# Patient Record
Sex: MALE | Race: WHITE | NOT HISPANIC OR LATINO | Employment: STUDENT | URBAN - METROPOLITAN AREA
[De-identification: names, ages, dates, MRNs, and addresses within clinical notes are randomized per-mention and may not be internally consistent; named-entity substitution may affect disease eponyms.]

---

## 2024-03-22 ENCOUNTER — HOSPITAL ENCOUNTER (INPATIENT)
Facility: HOSPITAL | Age: 24
LOS: 1 days | Discharge: HOME | DRG: 342 | End: 2024-03-23
Attending: EMERGENCY MEDICINE | Admitting: SURGERY
Payer: COMMERCIAL

## 2024-03-22 ENCOUNTER — APPOINTMENT (EMERGENCY)
Dept: CT IMAGING | Facility: HOSPITAL | Age: 24
DRG: 342 | End: 2024-03-22
Payer: COMMERCIAL

## 2024-03-22 DIAGNOSIS — K35.80 ACUTE APPENDICITIS, UNCOMPLICATED: Primary | ICD-10-CM

## 2024-03-22 DIAGNOSIS — Z90.49 S/P LAPAROSCOPIC APPENDECTOMY: ICD-10-CM

## 2024-03-22 LAB
ALBUMIN SERPL BCP-MCNC: 4.7 G/DL (ref 3.5–5)
ALP SERPL-CCNC: 55 U/L (ref 34–104)
ALT SERPL W P-5'-P-CCNC: 19 U/L (ref 7–52)
ANION GAP SERPL CALCULATED.3IONS-SCNC: 6 MMOL/L (ref 4–13)
AST SERPL W P-5'-P-CCNC: 27 U/L (ref 13–39)
BILIRUB SERPL-MCNC: 0.24 MG/DL (ref 0.2–1)
BILIRUB UR QL STRIP: NEGATIVE
BUN SERPL-MCNC: 28 MG/DL (ref 5–25)
CALCIUM SERPL-MCNC: 9.6 MG/DL (ref 8.4–10.2)
CHLORIDE SERPL-SCNC: 102 MMOL/L (ref 96–108)
CLARITY UR: CLEAR
CO2 SERPL-SCNC: 28 MMOL/L (ref 21–32)
COLOR UR: COLORLESS
CREAT SERPL-MCNC: 1.35 MG/DL (ref 0.6–1.3)
ERYTHROCYTE [DISTWIDTH] IN BLOOD BY AUTOMATED COUNT: 13.3 % (ref 11.6–15.1)
GFR SERPL CREATININE-BSD FRML MDRD: 74 ML/MIN/1.73SQ M
GLUCOSE SERPL-MCNC: 79 MG/DL (ref 65–140)
GLUCOSE UR STRIP-MCNC: NEGATIVE MG/DL
HCT VFR BLD AUTO: 40.4 % (ref 36.5–49.3)
HGB BLD-MCNC: 13 G/DL (ref 12–17)
HGB UR QL STRIP.AUTO: NEGATIVE
KETONES UR STRIP-MCNC: NEGATIVE MG/DL
LEUKOCYTE ESTERASE UR QL STRIP: NEGATIVE
MCH RBC QN AUTO: 28.9 PG (ref 26.8–34.3)
MCHC RBC AUTO-ENTMCNC: 32.2 G/DL (ref 31.4–37.4)
MCV RBC AUTO: 90 FL (ref 82–98)
NITRITE UR QL STRIP: NEGATIVE
PH UR STRIP.AUTO: 6 [PH]
PLATELET # BLD AUTO: 287 THOUSANDS/UL (ref 149–390)
PMV BLD AUTO: 9.9 FL (ref 8.9–12.7)
POTASSIUM SERPL-SCNC: 4.2 MMOL/L (ref 3.5–5.3)
PROT SERPL-MCNC: 7.5 G/DL (ref 6.4–8.4)
PROT UR STRIP-MCNC: NEGATIVE MG/DL
RBC # BLD AUTO: 4.5 MILLION/UL (ref 3.88–5.62)
SODIUM SERPL-SCNC: 136 MMOL/L (ref 135–147)
SP GR UR STRIP.AUTO: 1.03 (ref 1–1.03)
UROBILINOGEN UR STRIP-ACNC: <2 MG/DL
WBC # BLD AUTO: 17.5 THOUSAND/UL (ref 4.31–10.16)

## 2024-03-22 PROCEDURE — 87563 M. GENITALIUM AMP PROBE: CPT

## 2024-03-22 PROCEDURE — 99285 EMERGENCY DEPT VISIT HI MDM: CPT | Performed by: EMERGENCY MEDICINE

## 2024-03-22 PROCEDURE — 74177 CT ABD & PELVIS W/CONTRAST: CPT

## 2024-03-22 PROCEDURE — 87661 TRICHOMONAS VAGINALIS AMPLIF: CPT

## 2024-03-22 PROCEDURE — 96360 HYDRATION IV INFUSION INIT: CPT

## 2024-03-22 PROCEDURE — 81003 URINALYSIS AUTO W/O SCOPE: CPT

## 2024-03-22 PROCEDURE — 36415 COLL VENOUS BLD VENIPUNCTURE: CPT

## 2024-03-22 PROCEDURE — 99284 EMERGENCY DEPT VISIT MOD MDM: CPT

## 2024-03-22 PROCEDURE — 80053 COMPREHEN METABOLIC PANEL: CPT

## 2024-03-22 PROCEDURE — 85027 COMPLETE CBC AUTOMATED: CPT

## 2024-03-22 RX ADMIN — IOHEXOL 100 ML: 350 INJECTION, SOLUTION INTRAVENOUS at 22:28

## 2024-03-22 RX ADMIN — SODIUM CHLORIDE 1000 ML: 0.9 INJECTION, SOLUTION INTRAVENOUS at 22:37

## 2024-03-22 NOTE — Clinical Note
Case was discussed with Brittni Jara MD and the patient's admission status was agreed to be Admission Status: inpatient status to the service of   .

## 2024-03-23 ENCOUNTER — ANESTHESIA (INPATIENT)
Dept: PERIOP | Facility: HOSPITAL | Age: 24
DRG: 342 | End: 2024-03-23
Payer: COMMERCIAL

## 2024-03-23 ENCOUNTER — ANESTHESIA EVENT (INPATIENT)
Dept: PERIOP | Facility: HOSPITAL | Age: 24
DRG: 342 | End: 2024-03-23
Payer: COMMERCIAL

## 2024-03-23 VITALS
BODY MASS INDEX: 24.49 KG/M2 | WEIGHT: 197 LBS | RESPIRATION RATE: 16 BRPM | OXYGEN SATURATION: 98 % | DIASTOLIC BLOOD PRESSURE: 56 MMHG | TEMPERATURE: 98.3 F | HEART RATE: 54 BPM | SYSTOLIC BLOOD PRESSURE: 96 MMHG | HEIGHT: 75 IN

## 2024-03-23 PROBLEM — K35.80 ACUTE APPENDICITIS, UNCOMPLICATED: Status: ACTIVE | Noted: 2024-03-23

## 2024-03-23 PROBLEM — K35.80 ACUTE APPENDICITIS, UNCOMPLICATED: Status: RESOLVED | Noted: 2024-03-23 | Resolved: 2024-03-23

## 2024-03-23 PROBLEM — Z90.49 S/P LAPAROSCOPIC APPENDECTOMY: Status: ACTIVE | Noted: 2024-03-23

## 2024-03-23 LAB
ANION GAP SERPL CALCULATED.3IONS-SCNC: 7 MMOL/L (ref 4–13)
BASOPHILS # BLD AUTO: 0.07 THOUSANDS/ÂΜL (ref 0–0.1)
BASOPHILS NFR BLD AUTO: 1 % (ref 0–1)
BUN SERPL-MCNC: 20 MG/DL (ref 5–25)
CALCIUM SERPL-MCNC: 9 MG/DL (ref 8.4–10.2)
CHLORIDE SERPL-SCNC: 105 MMOL/L (ref 96–108)
CO2 SERPL-SCNC: 26 MMOL/L (ref 21–32)
CREAT SERPL-MCNC: 1.23 MG/DL (ref 0.6–1.3)
EOSINOPHIL # BLD AUTO: 0.21 THOUSAND/ÂΜL (ref 0–0.61)
EOSINOPHIL NFR BLD AUTO: 2 % (ref 0–6)
ERYTHROCYTE [DISTWIDTH] IN BLOOD BY AUTOMATED COUNT: 13.4 % (ref 11.6–15.1)
GFR SERPL CREATININE-BSD FRML MDRD: 83 ML/MIN/1.73SQ M
GLUCOSE SERPL-MCNC: 92 MG/DL (ref 65–140)
HCT VFR BLD AUTO: 37.3 % (ref 36.5–49.3)
HGB BLD-MCNC: 12 G/DL (ref 12–17)
IMM GRANULOCYTES # BLD AUTO: 0.03 THOUSAND/UL (ref 0–0.2)
IMM GRANULOCYTES NFR BLD AUTO: 0 % (ref 0–2)
LYMPHOCYTES # BLD AUTO: 2.44 THOUSANDS/ÂΜL (ref 0.6–4.47)
LYMPHOCYTES NFR BLD AUTO: 19 % (ref 14–44)
M GENITALIUM DNA SPEC QL NAA+PROBE: NEGATIVE
MAGNESIUM SERPL-MCNC: 2 MG/DL (ref 1.9–2.7)
MCH RBC QN AUTO: 28.8 PG (ref 26.8–34.3)
MCHC RBC AUTO-ENTMCNC: 32.2 G/DL (ref 31.4–37.4)
MCV RBC AUTO: 89 FL (ref 82–98)
MONOCYTES # BLD AUTO: 1.53 THOUSAND/ÂΜL (ref 0.17–1.22)
MONOCYTES NFR BLD AUTO: 12 % (ref 4–12)
NEUTROPHILS # BLD AUTO: 8.61 THOUSANDS/ÂΜL (ref 1.85–7.62)
NEUTS SEG NFR BLD AUTO: 66 % (ref 43–75)
NRBC BLD AUTO-RTO: 0 /100 WBCS
PHOSPHATE SERPL-MCNC: 5 MG/DL (ref 2.7–4.5)
PLATELET # BLD AUTO: 259 THOUSANDS/UL (ref 149–390)
PMV BLD AUTO: 9.9 FL (ref 8.9–12.7)
POTASSIUM SERPL-SCNC: 3.8 MMOL/L (ref 3.5–5.3)
RBC # BLD AUTO: 4.17 MILLION/UL (ref 3.88–5.62)
SODIUM SERPL-SCNC: 138 MMOL/L (ref 135–147)
T VAGINALIS DNA SPEC QL NAA+PROBE: NEGATIVE
WBC # BLD AUTO: 12.89 THOUSAND/UL (ref 4.31–10.16)

## 2024-03-23 PROCEDURE — 44970 LAPAROSCOPY APPENDECTOMY: CPT | Performed by: SURGERY

## 2024-03-23 PROCEDURE — 99024 POSTOP FOLLOW-UP VISIT: CPT | Performed by: SURGERY

## 2024-03-23 PROCEDURE — 85025 COMPLETE CBC W/AUTO DIFF WBC: CPT

## 2024-03-23 PROCEDURE — 88304 TISSUE EXAM BY PATHOLOGIST: CPT | Performed by: PATHOLOGY

## 2024-03-23 PROCEDURE — 83735 ASSAY OF MAGNESIUM: CPT

## 2024-03-23 PROCEDURE — 84100 ASSAY OF PHOSPHORUS: CPT

## 2024-03-23 PROCEDURE — 80048 BASIC METABOLIC PNL TOTAL CA: CPT

## 2024-03-23 PROCEDURE — 0DTJ4ZZ RESECTION OF APPENDIX, PERCUTANEOUS ENDOSCOPIC APPROACH: ICD-10-PCS | Performed by: SURGERY

## 2024-03-23 PROCEDURE — 99222 1ST HOSP IP/OBS MODERATE 55: CPT | Performed by: SURGERY

## 2024-03-23 RX ORDER — ACETAMINOPHEN 325 MG/1
650 TABLET ORAL EVERY 6 HOURS PRN
Status: DISCONTINUED | OUTPATIENT
Start: 2024-03-23 | End: 2024-03-23

## 2024-03-23 RX ORDER — LIDOCAINE HYDROCHLORIDE 10 MG/ML
INJECTION, SOLUTION EPIDURAL; INFILTRATION; INTRACAUDAL; PERINEURAL AS NEEDED
Status: DISCONTINUED | OUTPATIENT
Start: 2024-03-23 | End: 2024-03-23

## 2024-03-23 RX ORDER — METRONIDAZOLE 500 MG/100ML
500 INJECTION, SOLUTION INTRAVENOUS EVERY 8 HOURS
Status: DISCONTINUED | OUTPATIENT
Start: 2024-03-23 | End: 2024-03-23 | Stop reason: HOSPADM

## 2024-03-23 RX ORDER — NEOSTIGMINE METHYLSULFATE 1 MG/ML
INJECTION INTRAVENOUS AS NEEDED
Status: DISCONTINUED | OUTPATIENT
Start: 2024-03-23 | End: 2024-03-23

## 2024-03-23 RX ORDER — ONDANSETRON 2 MG/ML
4 INJECTION INTRAMUSCULAR; INTRAVENOUS ONCE AS NEEDED
Status: DISCONTINUED | OUTPATIENT
Start: 2024-03-23 | End: 2024-03-23 | Stop reason: HOSPADM

## 2024-03-23 RX ORDER — HYDROMORPHONE HCL IN WATER/PF 6 MG/30 ML
0.2 PATIENT CONTROLLED ANALGESIA SYRINGE INTRAVENOUS
Status: DISCONTINUED | OUTPATIENT
Start: 2024-03-23 | End: 2024-03-23

## 2024-03-23 RX ORDER — OXYCODONE HYDROCHLORIDE 10 MG/1
10 TABLET ORAL EVERY 4 HOURS PRN
Status: DISCONTINUED | OUTPATIENT
Start: 2024-03-23 | End: 2024-03-23

## 2024-03-23 RX ORDER — BUPIVACAINE HYDROCHLORIDE 5 MG/ML
INJECTION, SOLUTION EPIDURAL; INTRACAUDAL AS NEEDED
Status: DISCONTINUED | OUTPATIENT
Start: 2024-03-23 | End: 2024-03-23 | Stop reason: HOSPADM

## 2024-03-23 RX ORDER — FENTANYL CITRATE/PF 50 MCG/ML
25 SYRINGE (ML) INJECTION
Status: DISCONTINUED | OUTPATIENT
Start: 2024-03-23 | End: 2024-03-23 | Stop reason: HOSPADM

## 2024-03-23 RX ORDER — ACETAMINOPHEN 325 MG/1
650 TABLET ORAL EVERY 6 HOURS SCHEDULED
Status: DISCONTINUED | OUTPATIENT
Start: 2024-03-23 | End: 2024-03-23 | Stop reason: HOSPADM

## 2024-03-23 RX ORDER — PROPOFOL 10 MG/ML
INJECTION, EMULSION INTRAVENOUS AS NEEDED
Status: DISCONTINUED | OUTPATIENT
Start: 2024-03-23 | End: 2024-03-23

## 2024-03-23 RX ORDER — HYDROMORPHONE HCL IN WATER/PF 6 MG/30 ML
0.2 PATIENT CONTROLLED ANALGESIA SYRINGE INTRAVENOUS EVERY 4 HOURS PRN
Status: DISCONTINUED | OUTPATIENT
Start: 2024-03-23 | End: 2024-03-23

## 2024-03-23 RX ORDER — ACETAMINOPHEN 325 MG/1
650 TABLET ORAL EVERY 6 HOURS PRN
Status: CANCELLED | COMMUNITY
Start: 2024-03-23

## 2024-03-23 RX ORDER — MAGNESIUM HYDROXIDE 1200 MG/15ML
LIQUID ORAL AS NEEDED
Status: DISCONTINUED | OUTPATIENT
Start: 2024-03-23 | End: 2024-03-23 | Stop reason: HOSPADM

## 2024-03-23 RX ORDER — SODIUM CHLORIDE 9 MG/ML
100 INJECTION, SOLUTION INTRAVENOUS CONTINUOUS
Status: DISCONTINUED | OUTPATIENT
Start: 2024-03-23 | End: 2024-03-23

## 2024-03-23 RX ORDER — ACETAMINOPHEN 325 MG/1
650 TABLET ORAL EVERY 6 HOURS SCHEDULED
COMMUNITY
Start: 2024-03-24

## 2024-03-23 RX ORDER — GLYCOPYRROLATE 0.2 MG/ML
INJECTION INTRAMUSCULAR; INTRAVENOUS AS NEEDED
Status: DISCONTINUED | OUTPATIENT
Start: 2024-03-23 | End: 2024-03-23

## 2024-03-23 RX ORDER — ONDANSETRON 2 MG/ML
4 INJECTION INTRAMUSCULAR; INTRAVENOUS EVERY 6 HOURS PRN
Status: DISCONTINUED | OUTPATIENT
Start: 2024-03-23 | End: 2024-03-23 | Stop reason: HOSPADM

## 2024-03-23 RX ORDER — CEFAZOLIN SODIUM 1 G/3ML
INJECTION, POWDER, FOR SOLUTION INTRAMUSCULAR; INTRAVENOUS AS NEEDED
Status: DISCONTINUED | OUTPATIENT
Start: 2024-03-23 | End: 2024-03-23

## 2024-03-23 RX ORDER — OXYCODONE HYDROCHLORIDE 5 MG/1
TABLET ORAL
Qty: 20 TABLET | Refills: 0 | Status: SHIPPED | OUTPATIENT
Start: 2024-03-23

## 2024-03-23 RX ORDER — ROCURONIUM BROMIDE 10 MG/ML
INJECTION, SOLUTION INTRAVENOUS AS NEEDED
Status: DISCONTINUED | OUTPATIENT
Start: 2024-03-23 | End: 2024-03-23

## 2024-03-23 RX ORDER — FENTANYL CITRATE 50 UG/ML
INJECTION, SOLUTION INTRAMUSCULAR; INTRAVENOUS AS NEEDED
Status: DISCONTINUED | OUTPATIENT
Start: 2024-03-23 | End: 2024-03-23

## 2024-03-23 RX ORDER — OXYCODONE HYDROCHLORIDE 5 MG/1
5 TABLET ORAL EVERY 4 HOURS PRN
Status: DISCONTINUED | OUTPATIENT
Start: 2024-03-23 | End: 2024-03-23

## 2024-03-23 RX ORDER — POTASSIUM CHLORIDE 20 MEQ/1
20 TABLET, EXTENDED RELEASE ORAL ONCE
Status: DISCONTINUED | OUTPATIENT
Start: 2024-03-23 | End: 2024-03-23 | Stop reason: HOSPADM

## 2024-03-23 RX ORDER — SODIUM CHLORIDE, SODIUM LACTATE, POTASSIUM CHLORIDE, CALCIUM CHLORIDE 600; 310; 30; 20 MG/100ML; MG/100ML; MG/100ML; MG/100ML
INJECTION, SOLUTION INTRAVENOUS CONTINUOUS PRN
Status: DISCONTINUED | OUTPATIENT
Start: 2024-03-23 | End: 2024-03-23

## 2024-03-23 RX ORDER — OXYCODONE HYDROCHLORIDE 5 MG/1
5 TABLET ORAL EVERY 4 HOURS PRN
Status: DISCONTINUED | OUTPATIENT
Start: 2024-03-23 | End: 2024-03-23 | Stop reason: HOSPADM

## 2024-03-23 RX ORDER — SODIUM CHLORIDE, SODIUM GLUCONATE, SODIUM ACETATE, POTASSIUM CHLORIDE, MAGNESIUM CHLORIDE, SODIUM PHOSPHATE, DIBASIC, AND POTASSIUM PHOSPHATE .53; .5; .37; .037; .03; .012; .00082 G/100ML; G/100ML; G/100ML; G/100ML; G/100ML; G/100ML; G/100ML
125 INJECTION, SOLUTION INTRAVENOUS CONTINUOUS
Status: DISCONTINUED | OUTPATIENT
Start: 2024-03-23 | End: 2024-03-23 | Stop reason: HOSPADM

## 2024-03-23 RX ORDER — MIDAZOLAM HYDROCHLORIDE 2 MG/2ML
INJECTION, SOLUTION INTRAMUSCULAR; INTRAVENOUS AS NEEDED
Status: DISCONTINUED | OUTPATIENT
Start: 2024-03-23 | End: 2024-03-23

## 2024-03-23 RX ADMIN — NEOSTIGMINE METHYLSULFATE 3 MG: 1 INJECTION INTRAVENOUS at 13:54

## 2024-03-23 RX ADMIN — SODIUM CHLORIDE 100 ML/HR: 0.9 INJECTION, SOLUTION INTRAVENOUS at 01:13

## 2024-03-23 RX ADMIN — SODIUM CHLORIDE, SODIUM GLUCONATE, SODIUM ACETATE, POTASSIUM CHLORIDE, MAGNESIUM CHLORIDE, SODIUM PHOSPHATE, DIBASIC, AND POTASSIUM PHOSPHATE 125 ML/HR: .53; .5; .37; .037; .03; .012; .00082 INJECTION, SOLUTION INTRAVENOUS at 04:21

## 2024-03-23 RX ADMIN — CEFAZOLIN 2000 MG: 1 INJECTION, POWDER, FOR SOLUTION INTRAMUSCULAR; INTRAVENOUS at 13:12

## 2024-03-23 RX ADMIN — FENTANYL CITRATE 100 MCG: 50 INJECTION INTRAMUSCULAR; INTRAVENOUS at 12:59

## 2024-03-23 RX ADMIN — SODIUM CHLORIDE, SODIUM LACTATE, POTASSIUM CHLORIDE, AND CALCIUM CHLORIDE: .6; .31; .03; .02 INJECTION, SOLUTION INTRAVENOUS at 14:03

## 2024-03-23 RX ADMIN — PROPOFOL 300 MG: 10 INJECTION, EMULSION INTRAVENOUS at 12:59

## 2024-03-23 RX ADMIN — SODIUM CHLORIDE, SODIUM LACTATE, POTASSIUM CHLORIDE, AND CALCIUM CHLORIDE: .6; .31; .03; .02 INJECTION, SOLUTION INTRAVENOUS at 12:56

## 2024-03-23 RX ADMIN — DEXTROSE 2000 MG: 50 INJECTION, SOLUTION INTRAVENOUS at 03:43

## 2024-03-23 RX ADMIN — GLYCOPYRROLATE 0.4 MG: 0.2 INJECTION INTRAMUSCULAR; INTRAVENOUS at 13:54

## 2024-03-23 RX ADMIN — MIDAZOLAM 2 MG: 1 INJECTION INTRAMUSCULAR; INTRAVENOUS at 12:56

## 2024-03-23 RX ADMIN — LIDOCAINE HYDROCHLORIDE 50 MG: 10 INJECTION, SOLUTION EPIDURAL; INFILTRATION; INTRACAUDAL; PERINEURAL at 12:59

## 2024-03-23 RX ADMIN — ROCURONIUM BROMIDE 50 MG: 10 INJECTION, SOLUTION INTRAVENOUS at 12:59

## 2024-03-23 RX ADMIN — METRONIDAZOLE 500 MG: 500 INJECTION, SOLUTION INTRAVENOUS at 10:16

## 2024-03-23 RX ADMIN — METRONIDAZOLE 500 MG: 500 INJECTION, SOLUTION INTRAVENOUS at 17:22

## 2024-03-23 RX ADMIN — ACETAMINOPHEN 650 MG: 325 TABLET, FILM COATED ORAL at 17:22

## 2024-03-23 RX ADMIN — METRONIDAZOLE 500 MG: 500 INJECTION, SOLUTION INTRAVENOUS at 03:04

## 2024-03-23 NOTE — DISCHARGE INSTR - AVS FIRST PAGE
[facility] Surgical Discharge Instructions    Please call [facility] General Surgery ([phone]) to schedule your follow-up appointment in 2 weeks.    Activity:  - No lifting, pushing, or pulling anything over 10 pounds for 4-6 weeks or until cleared by your physician  - Regular activity (working around the house, walking up/down stairs, etc.) is ok and encouraged  - No driving until you are no longer using narcotic pain medications    Diet:    - You may resume your normal diet    Wound Care:  - You may remove your dressing and shower 24hrs after your procedure  - When you shower, allow soapy water to run over your incisions and then pat dry. Do NOT rub or scrub your incisions  - Do not submerge your incisions in tubs, baths, pools, etc until cleared by your surgeon  - Do not apply any lotions, creams, or ointments to your incision until cleared by your surgeon  - You may use daily dressing changes as needed (to prevent catching on your clothing or to absorb any drainage) with a dry gauze dressing held in place with a small piece of tape    Medications:    - Continue your pre-hospital medication regimen after discharge unless you were instructed otherwise. Please refer to your discharge medication list for further details.  - For the first few days following your procedure, take Tylenol to provide a solid baseline pain control and use the stronger pain medications (if provided) for more severe pain    Additional Instructions:  - If you have any questions or concerns after discharge please do not hesitate to contact our office, we would be happy to provide clarification      Call our office or return to the Emergency Room if you develop a fever greater than 101F, chills, persistent nausea/vomiting, worsening/uncontrollable pain, and/or increasing redness or purulent/foul smelling drainage from your incision(s)

## 2024-03-23 NOTE — ANESTHESIA PREPROCEDURE EVALUATION
Procedure:  APPENDECTOMY LAPAROSCOPIC (Abdomen)    Relevant Problems   No relevant active problems        Physical Exam    Airway    Mallampati score: II  TM Distance: >3 FB  Neck ROM: full     Dental   No notable dental hx     Cardiovascular      Pulmonary      Other Findings        Anesthesia Plan  ASA Score- 2 Emergent    Anesthesia Type- general with ASA Monitors.         Additional Monitors:     Airway Plan: ETT.           Plan Factors-Exercise tolerance (METS): >4 METS.    Chart reviewed.   Existing labs reviewed. Patient summary reviewed.    Patient is not a current smoker.  Patient did not smoke on day of surgery.            Induction- intravenous.    Postoperative Plan- Plan for postoperative opioid use. Planned trial extubation    Informed Consent- Anesthetic plan and risks discussed with patient.  I personally reviewed this patient with the CRNA. Discussed and agreed on the Anesthesia Plan with the CRNA..

## 2024-03-23 NOTE — UTILIZATION REVIEW
"Initial Clinical Review    Admission: Date/Time/Statement:   Admission Orders (From admission, onward)       Ordered        03/23/24 0136  Inpatient Admission  Once                          Orders Placed This Encounter   Procedures    Inpatient Admission     Standing Status:   Standing     Number of Occurrences:   1     Order Specific Question:   Level of Care     Answer:   Med Surg [16]     Order Specific Question:   Estimated length of stay     Answer:   Not Applicable     ED Arrival Information       Expected   -    Arrival   3/22/2024 20:59    Acuity   Urgent              Means of arrival   Ambulance    Escorted by   [facility]    Service   Surgery-General    Admission type   Emergency              Arrival complaint   EMS-Abd Pain             Chief Complaint   Patient presents with    Abdominal Pain     Pt. Endorses RLQ acute Abd. Pain x 1-2 hours. Pt. \"Googled\" pain of this type and is concerned for AP +Rebound pain. Denies any abdominal history. +Nausea -Vomit -CP/SOB.       Initial Presentation: 23 y.o. male to ED by EMS presents w < 1 day HX RLQ ABD pain w nausea, sharp pain starts @ umbilicus and has shifted towards his right lower quadrant.  Inpatient admission on surgical service due to acute appendicitis    EXAM  MOD intensity sudden onset, sharp/ non radiating ABD pain RLQ, + McBurneys sign; Labs:  leukocytosis to 17.50,  elevated BUN/Cr of 28/1.35, c/w SABINA POA likely 2/2 dehydration. CT s/p  acute uncomplicated appendicitis   PLAN  NPO, IVF, trend UOP/CR, IV Rocephin /Flagyl, PRN Multimodal pain & antiemetics agents, OR plan for LAP APPY     SURGERY DATE: 3/23/2024   Procedure(s):  LAPAROSCOPIC APPENDECTOMY    Anesthesia Type:   General   Operative Findings:  Acute non-perforated appendicitis    PATIENT DC 3/23/2024 @ 1929    ED Triage Vitals [03/22/24 2104]   Temperature Pulse Respirations Blood Pressure SpO2   98 °F (36.7 °C) 66 16 126/59 99 %      Temp Source Heart Rate Source " "Patient Position - Orthostatic VS BP Location FiO2 (%)   Oral Monitor Sitting Right arm --      Pain Score       2          Wt Readings from Last 1 Encounters:   03/22/24 89.4 kg (197 lb)     Additional Vital Signs:   Date/Time Temp Pulse Resp BP MAP (mmHg) SpO2 O2 Flow Rate (L/min) O2 Device Cardiac (WDL) Patient Position - Orthostatic VS   03/23/24 15:20:20 98.3 °F (36.8 °C) 54 Abnormal  16 96/56 69 98 % -- None (Room air) -- Lying   03/23/24 1422 98.8 °F (37.1 °C) 53 Abnormal  16 112/56 -- 98 % -- None (Room air) WDL --   03/23/24 1412 98.6 °F (37 °C) 60 16 115/53 -- 100 % -- None (Room air) WDL --   03/23/24 1407 99.7 °F (37.6 °C) 57 16 124/58 -- 100 % 6 L/min Simple mask WDL --   03/23/24 1234 98.3 °F (36.8 °C) 72 18 117/59 -- 95 % -- None (Room air) -- --   03/23/24 07:51:39 98.2 °F (36.8 °C) 49 Abnormal  16 122/51 75 97 % -- -- -- --   03/23/24 02:29:11 98.2 °F (36.8 °C) 54 Abnormal  16 121/54 76 100 % -- -- -- --   03/23/24 0023 -- 60 16 122/58 82 100 % -- None (Room air) -- Lying   03/22/24 2104 98 °F (36.7 °C) 66 16 126/59 85 99 % -- None (Room air) -- Sitting     Weights (last 14 days)    Date/Time Weight Weight Method Height   03/22/24 2104 89.4 kg (197 lb) Standing scale 6' 3\" (1.905 m)     Pertinent Labs/Diagnostic Test Results:   CT abdomen pelvis with contrast   Final Result by Yessica Steward MD (03/23 0041)      Acute uncomplicated appendicitis      Findings discussed with Dr Gloria at 12:40 a.m., 3/23/2020      Workstation performed: [id number]               Results from last 7 days   Lab Units 03/23/24  0603 03/22/24  2154   WBC Thousand/uL 12.89* 17.50*   HEMOGLOBIN g/dL 12.0 13.0   HEMATOCRIT % 37.3 40.4   PLATELETS Thousands/uL 259 287   NEUTROS ABS Thousands/µL 8.61*  --          Results from last 7 days   Lab Units 03/23/24  0603 03/22/24  2154   SODIUM mmol/L 138 136   POTASSIUM mmol/L 3.8 4.2   CHLORIDE mmol/L 105 102   CO2 mmol/L 26 28   ANION GAP mmol/L 7 6   BUN mg/dL 20 28*   CREATININE " "mg/dL 1.23 1.35*   EGFR ml/min/1.73sq m 83 74   CALCIUM mg/dL 9.0 9.6   MAGNESIUM mg/dL 2.0  --    PHOSPHORUS mg/dL 5.0*  --      Results from last 7 days   Lab Units 03/22/24  2154   AST U/L 27   ALT U/L 19   ALK PHOS U/L 55   TOTAL PROTEIN g/dL 7.5   ALBUMIN g/dL 4.7   TOTAL BILIRUBIN mg/dL 0.24         Results from last 7 days   Lab Units 03/23/24  0603 03/22/24  2154   GLUCOSE RANDOM mg/dL 92 79             No results found for: \"BETA-HYDROXYBUTYRATE\"                     Results from last 7 days   Lab Units 03/22/24  2310   CLARITY UA  Clear   COLOR UA  Colorless   SPEC GRAV UA  1.034*   PH UA  6.0   GLUCOSE UA mg/dl Negative   KETONES UA mg/dl Negative   BLOOD UA  Negative   PROTEIN UA mg/dl Negative   NITRITE UA  Negative   BILIRUBIN UA  Negative   UROBILINOGEN UA (BE) mg/dl <2.0   LEUKOCYTES UA  Negative     ED Treatment:   Medication Administration from 03/22/2024 2059 to 03/23/2024 0223         Date/Time Order Dose Route Action     03/22/2024 2337 EDT sodium chloride 0.9 % bolus 1,000 mL 0 mL Intravenous Stopped     03/22/2024 2237 EDT sodium chloride 0.9 % bolus 1,000 mL 1,000 mL Intravenous New Bag     03/22/2024 2228 EDT iohexol (OMNIPAQUE) 350 MG/ML injection (MULTI-DOSE) 100 mL 100 mL Intravenous Given     03/23/2024 0113 EDT sodium chloride 0.9 % infusion 100 mL/hr Intravenous New Bag          No past medical history on file.  Present on Admission:  **None**      Admitting Diagnosis: Abdominal pain [R10.9]  Acute appendicitis, uncomplicated [K35.80]  Age/Sex: 23 y.o. male  Admission Orders:  NPO  I/O    Scheduled Medications:  [Transfer Hold] cefTRIAXone, 2,000 mg, Intravenous, Q24H  [Transfer Hold] metroNIDAZOLE, 500 mg, Intravenous, Q8H  [Transfer Hold] potassium chloride, 20 mEq, Oral, Once      Continuous IV Infusions:  multi-electrolyte, 125 mL/hr, Intravenous, Continuous      PRN Meds:  [Transfer Hold] acetaminophen, 650 mg, Oral, Q6H PRN  [Transfer Hold] HYDROmorphone, 0.2 mg, Intravenous, Q4H " PRN  [Transfer Hold] HYDROmorphone, 0.2 mg, Intravenous, Q3H PRN  [Transfer Hold] ondansetron, 4 mg, Intravenous, Q6H PRN  [Transfer Hold] oxyCODONE, 10 mg, Oral, Q4H PRN  [Transfer Hold] oxyCODONE, 5 mg, Oral, Q4H PRN    Network Utilization Review Department  ATTENTION: Please call with any questions or concerns to [phone] and carefully listen to the prompts so that you are directed to the right person. All voicemails are confidential.   For Discharge needs, contact Care Management DC Support Team at [phone] opt. [phone]  Send all requests for admission clinical reviews, approved or denied determinations and any other requests to dedicated fax number below belonging to the campus where the patient is receiving treatment. List of dedicated fax numbers for the Facilities:  FACILITY NAME UR FAX NUMBER   ADMISSION DENIALS (Administrative/Medical Necessity) [phone]   DISCHARGE SUPPORT TEAM (NETWORK) [phone]   PARENT CHILD HEALTH (Maternity/NICU/Pediatrics) [phone]   [facility] [phone]   [facility] [phone]   [facility] [phone]   [facility] [phone]   [facility] [phone]   [facility] [phone]   [facility] [phone]   [facility] [phone]   [facility] [phone]   [facility] [phone]   [facility] [phone]   [facility] ([place], [place]) [phone]

## 2024-03-23 NOTE — PROGRESS NOTES
"General Surgery  Progress Note   Seamus Reddy 23 y.o. male MRN: [id number]  Unit/Bed#: W -01 Encounter: [id number]    Assessment:  23M who presents with <1 day history of RLQ abdominal pain. Laboratory data significant for leukocytosis to 17.50 and elevated BUN/Cr of 28/1.35, c/w SABINA POA likely 2/2 dehydration. CT imaging with acute uncomplicated appendicitis.      AVSS on RA    WBC 12.89 (17.50)  Hgb 12.0 (13.0)  Cr 1.23 (1.35)    Plan:  -OR today for laparoscopic appendectomy  -Pain/ nausea control PRN  -OOB/ ambulation  -Incentive Spirometry        Subjective/Objective     Subjective:   Patient seen and examined at bedside, in no acute distress. No acute events overnight.  Pain comes and goes.  No nausea vomiting fevers chills chest pain or shortness of breath.    Objective:   Vitals:Blood pressure 122/51, pulse (!) 49, temperature 98.2 °F (36.8 °C), resp. rate 16, height 6' 3\" (1.905 m), weight 89.4 kg (197 lb), SpO2 97%.  Temp (24hrs), Av.1 °F (36.7 °C), Min:98 °F (36.7 °C), Max:98.2 °F (36.8 °C)      I/O       None            Invasive Devices       Peripheral Intravenous Line  Duration             Peripheral IV 24 Left Antecubital <1 day                    Physical Exam:  General: No acute distress  Neuro: Awake, Alert and Oriented x 3  HEENT:  Normocephalic, atraumatic, moist mucous membranes  CV: Regular rate and rhythm  Lungs: Normal work of breathing, no increased respiratory effort  Abdomen: Soft, tender with deep palpation in the right lower quadrant, nondistended   Extremities: No edema, clubbing or cyanosis  Skin: Warm, dry    Lab Results   Component Value Date    WBC 12.89 (H) 2024    HGB 12.0 2024    HCT 37.3 2024    MCV 89 2024     2024      Lab Results   Component Value Date    SODIUM 138 2024    K 3.8 2024     2024    CO2 26 2024    AGAP 7 2024    BUN 20 2024    CREATININE 1.23 2024    " GLUC 92 03/23/2024    CALCIUM 9.0 03/23/2024    AST 27 03/22/2024    ALT 19 03/22/2024    ALKPHOS 55 03/22/2024    TP 7.5 03/22/2024    TBILI 0.24 03/22/2024    EGFR 83 03/23/2024       VTE Prophylaxis: Sequential compression device (Venodyne)       Brittni Jara MD  3/23/2024  10:03 AM

## 2024-03-23 NOTE — ANESTHESIA POSTPROCEDURE EVALUATION
Post-Op Assessment Note    CV Status:  Stable  Pain Score: 0    Pain management: adequate       Mental Status:  Lethargic and awake   Hydration Status:  Stable   PONV Controlled:  None   Airway Patency:  Patent     Post Op Vitals Reviewed: Yes      Staff: Anesthesiologist, CRNA               BP   126/58   Temp   99.7   Pulse  57   Resp   14   SpO2   100

## 2024-03-23 NOTE — H&P
Acute Care Surgery  History and Physical  Seamus Reddy 23 y.o. male MRN: [id number]  Unit/Bed#: W -01 Encounter: [id number]    Assessment:  23-year-old male with no pertinent past medical history presents with acute appendicitis.  Tender in right lower quadrant.  Afebrile, hemodynamically stable.  WBC 17.50 CT scan shows minimally dilated appendix WBC 11, Hb 13.    3/22 CTAP: IMPRESSION:     Acute uncomplicated appendicitis       Plan:  -Admit to Surgery service  -Plan for Laparoscopic Appendectomy  -NPO  -IV fluids  -Pain nausea PRN  -OOO/ambulation  -Incentive Spirometer      History of Present Illness   HPI:  Seamus Reddy is a 23 y.o. male who presents with right lower quadrant abdominal pain.  7:15 PM patient was drinking his protein shake when his stomach started to experience some discomfort.  Patient believes that may be caught a stomach virus so he decided to monitor it.  Pain continued to worsen.  Patient denied nausea or vomiting.  Patient denies fevers chills chest pain or shortness of breath.  Patient denied episodes of diarrhea patient reports no abdominal pain currently at the moment however if he laughs coughs walks or sits up right he has intense pain in his right lower quadrant.  Patient has no medical history or surgical history.  Patient denies any allergies, on any medications.  Patient reports that he drinks twice a week more than 5 drinks at a time, takes nicotine patches for a history of vaping, no recreational drug use.    Review of Systems    Historical Information   No past medical history on file.  No past surgical history on file.  Social History   Social History     Substance and Sexual Activity   Alcohol Use Not on file     Social History     Substance and Sexual Activity   Drug Use Not on file     Social History     Tobacco Use   Smoking Status Not on file   Smokeless Tobacco Not on file     Family History: non-contributory    Meds/Allergies   all  "medications and allergies reviewed  No Known Allergies    Objective   First Vitals:   Blood Pressure: 126/59 (03/22/24 2104)  Pulse: 66 (03/22/24 2104)  Temperature: 98 °F (36.7 °C) (03/22/24 2104)  Temp Source: Oral (03/22/24 2104)  Respirations: 16 (03/22/24 2104)  Height: 6' 3\" (190.5 cm) (03/22/24 2104)  Weight - Scale: 89.4 kg (197 lb) (03/22/24 2104)  SpO2: 99 % (03/22/24 2104)    Current Vitals:   Blood Pressure: 122/58 (03/23/24 0023)  Pulse: 60 (03/23/24 0023)  Temperature: 98 °F (36.7 °C) (03/22/24 2104)  Temp Source: Oral (03/22/24 2104)  Respirations: 16 (03/23/24 0023)  Height: 6' 3\" (190.5 cm) (03/22/24 2104)  Weight - Scale: 89.4 kg (197 lb) (03/22/24 2104)  SpO2: 100 % (03/23/24 0023)    No intake or output data in the 24 hours ending 03/23/24 0204    Invasive Devices       Peripheral Intravenous Line  Duration             Peripheral IV 03/22/24 Left Antecubital <1 day                    Physical Exam:  General: No acute distress  Neuro: Awake, Alert and Oriented x 3  HEENT:  Normocephalic, atraumatic, moist mucous membranes  CV: Regular rate and rhythm  Lungs: Normal work of breathing, no increased respiratory effort  Abdomen: Soft, mild tenderness in the right lower quadrant with deep palpation non-distended.  Extremities: No edema, clubbing or cyanosis  Skin: Warm, dry    Lab Results: I have personally reviewed pertinent lab results.  , CBC:   Lab Results   Component Value Date    WBC 17.50 (H) 03/22/2024    HGB 13.0 03/22/2024    HCT 40.4 03/22/2024    MCV 90 03/22/2024     03/22/2024    RBC 4.50 03/22/2024    MCH 28.9 03/22/2024    MCHC 32.2 03/22/2024    RDW 13.3 03/22/2024    MPV 9.9 03/22/2024   , CMP:   Lab Results   Component Value Date    SODIUM 136 03/22/2024    K 4.2 03/22/2024     03/22/2024    CO2 28 03/22/2024    BUN 28 (H) 03/22/2024    CREATININE 1.35 (H) 03/22/2024    CALCIUM 9.6 03/22/2024    AST 27 03/22/2024    ALT 19 03/22/2024    ALKPHOS 55 03/22/2024    EGFR 74 " 03/22/2024     Imaging: I have personally reviewed pertinent reports.    EKG, Pathology, and Other Studies: I have personally reviewed pertinent reports.      Code Status: Level 1 - Full Code  Advance Directive and Living Will:      Power of :    POLST:      Counseling / Coordination of Care  Total floor / unit time spent today 25 minutes. This involved direct patient contact where I performed a full history and physical, reviewed previous records, and reviewed laboratory and other diagnostic studies. Greater than 50% of total time was spent with the patient and / or family counseling and / or coordination of care.    Brittni Jara MD  General Surgery PGY-1  3/23/2024

## 2024-03-23 NOTE — ED PROVIDER NOTES
"History  Chief Complaint   Patient presents with    Abdominal Pain     Pt. Endorses RLQ acute Abd. Pain x 1-2 hours. Pt. \"Googled\" pain of this type and is concerned for AP +Rebound pain. Denies any abdominal history. +Nausea -Vomit -CP/SOB.     23 year old male presented with complaint of right lower quadrant abdominal pain.  States that it started at 7 PM suddenly when he was drinking his protein shake.  Does endorse that it started years umbilicus and has shifted towards his right lower quadrant.  Pain was sharp shooting.  Patient presented to the ED as he was concerned for possible appendicitis.  RUQ pain is associated with mild nausea without any vomiting.  Denies any history of trauma, any changes in his exercising, diarrhea or constipation.  He does state that he drinks 5-7 alcohol twice per week.  Denies any cigarette smoking but has vaping history.  Denies any substance use or drugs.  Patient is sexually active with 1 partner.  No recent history of STDs.      History provided by:  Patient  Abdominal Pain  Pain location:  RLQ  Pain quality: sharp    Pain radiates to:  Does not radiate  Pain severity:  Moderate  Onset quality:  Sudden  Duration:  2 hours  Chronicity:  New  Relieved by:  Nothing  Worsened by:  Deep breathing, movement and position changes  Ineffective treatments:  None tried  Associated symptoms: nausea    Associated symptoms: no chest pain, no chills, no cough, no dysuria, no fever, no hematuria, no shortness of breath, no sore throat and no vomiting        None       No past medical history on file.    No past surgical history on file.    No family history on file.  I have reviewed and agree with the history as documented.    No existing history information found.  No existing history information found.        Review of Systems   Constitutional:  Negative for appetite change, chills and fever.   HENT:  Negative for ear pain and sore throat.    Eyes:  Negative for pain and visual disturbance. "   Respiratory:  Negative for cough and shortness of breath.    Cardiovascular:  Negative for chest pain and palpitations.   Gastrointestinal:  Positive for abdominal pain and nausea. Negative for vomiting.   Genitourinary:  Negative for dysuria and hematuria.   Musculoskeletal:  Negative for arthralgias and back pain.   Skin:  Negative for color change and rash.   Neurological:  Negative for seizures and syncope.   All other systems reviewed and are negative.      Physical Exam  ED Triage Vitals [03/22/24 2104]   Temperature Pulse Respirations Blood Pressure SpO2   98 °F (36.7 °C) 66 16 126/59 99 %      Temp Source Heart Rate Source Patient Position - Orthostatic VS BP Location FiO2 (%)   Oral Monitor Sitting Right arm --      Pain Score       2             Orthostatic Vital Signs  Vitals:    03/22/24 2104 03/23/24 0023   BP: 126/59 122/58   Pulse: 66 60   Patient Position - Orthostatic VS: Sitting Lying       Physical Exam  Vitals and nursing note reviewed.   Constitutional:       General: He is not in acute distress.     Appearance: He is well-developed.   HENT:      Head: Normocephalic and atraumatic.   Eyes:      Conjunctiva/sclera: Conjunctivae normal.   Cardiovascular:      Rate and Rhythm: Normal rate and regular rhythm.      Heart sounds: No murmur heard.  Pulmonary:      Effort: Pulmonary effort is normal. No respiratory distress.      Breath sounds: Normal breath sounds.   Abdominal:      Palpations: Abdomen is soft.      Tenderness: There is abdominal tenderness in the right lower quadrant. Positive signs include McBurney's sign.   Musculoskeletal:         General: No swelling.      Cervical back: Neck supple.   Skin:     General: Skin is warm and dry.      Capillary Refill: Capillary refill takes less than 2 seconds.   Neurological:      Mental Status: He is alert and oriented to person, place, and time.   Psychiatric:         Mood and Affect: Mood normal.         ED Medications  Medications   sodium  chloride 0.9 % infusion (has no administration in time range)   HYDROmorphone HCl (DILAUDID) injection 0.2 mg (has no administration in time range)   sodium chloride 0.9 % bolus 1,000 mL (0 mL Intravenous Stopped 3/22/24 2337)   iohexol (OMNIPAQUE) 350 MG/ML injection (MULTI-DOSE) 100 mL (100 mL Intravenous Given 3/22/24 2228)       Diagnostic Studies  Results Reviewed       Procedure Component Value Units Date/Time    UA w Reflex to Microscopic w Reflex to Culture [[id number]]  (Abnormal) Collected: 03/22/24 2310    Lab Status: Final result Specimen: Urine, Clean Catch Updated: 03/22/24 2332     Color, UA Colorless     Clarity, UA Clear     Specific Gravity, UA 1.034     pH, UA 6.0     Leukocytes, UA Negative     Nitrite, UA Negative     Protein, UA Negative mg/dl      Glucose, UA Negative mg/dl      Ketones, UA Negative mg/dl      Urobilinogen, UA <2.0 mg/dl      Bilirubin, UA Negative     Occult Blood, UA Negative    Trichomonas vaginalis/Mycoplasma genitalium PCR [[id number]] Collected: 03/22/24 2313    Lab Status: In process Specimen: Urine, Clean Catch Updated: 03/22/24 2331    Comprehensive metabolic panel [[id number]]  (Abnormal) Collected: 03/22/24 2154    Lab Status: Final result Specimen: Blood from Arm, Left Updated: 03/22/24 2215     Sodium 136 mmol/L      Potassium 4.2 mmol/L      Chloride 102 mmol/L      CO2 28 mmol/L      ANION GAP 6 mmol/L      BUN 28 mg/dL      Creatinine 1.35 mg/dL      Glucose 79 mg/dL      Calcium 9.6 mg/dL      AST 27 U/L      ALT 19 U/L      Alkaline Phosphatase 55 U/L      Total Protein 7.5 g/dL      Albumin 4.7 g/dL      Total Bilirubin 0.24 mg/dL      eGFR 74 ml/min/1.73sq m     Narrative:      National Kid[employer] guidelines for Chronic Kidney Disease (CKD):     Stage 1 with normal or high GFR (GFR > 90 mL/min/1.73 square meters)    Stage 2 Mild CKD (GFR = 60-89 mL/min/1.73 square meters)    Stage 3A Moderate CKD (GFR = 45-59 mL/min/1.73 square meters)    Stage  3B Moderate CKD (GFR = 30-44 mL/min/1.73 square meters)    Stage 4 Severe CKD (GFR = 15-29 mL/min/1.73 square meters)    Stage 5 End Stage CKD (GFR <15 mL/min/1.73 square meters)  Note: GFR calculation is accurate only with a steady state creatinine    CBC and Platelet [[id number]]  (Abnormal) Collected: 03/22/24 2154    Lab Status: Final result Specimen: Blood from Arm, Left Updated: 03/22/24 2203     WBC 17.50 Thousand/uL      RBC 4.50 Million/uL      Hemoglobin 13.0 g/dL      Hematocrit 40.4 %      MCV 90 fL      MCH 28.9 pg      MCHC 32.2 g/dL      RDW 13.3 %      Platelets 287 Thousands/uL      MPV 9.9 fL                    CT abdomen pelvis with contrast   Final Result by Yessica Steward MD (03/23 0041)      Acute uncomplicated appendicitis      Findings discussed with Dr Gloria at 12:40 a.m., 3/23/2020      Workstation performed: [id number]               Procedures  Procedures      ED Course  ED Course as of 03/23/24 0052   Fri Mar 22, 2024   2105 23 year old male patient presenting with complaint of    quadrant abdominal pain. It started  , suddenly, does not radiate. Associated with nausea, vomiting, diarrhea.   Sat Mar 23, 2024   0051 Call received from radiology, CT pelvis reading for acute appendicitis, uncomplicated.  Consulted and message sent to general surgery.  Patient is being made NPO.                             SBIRT 22yo+      Flowsheet Row Most Recent Value   Initial Alcohol Screen: US AUDIT-C     1. How often do you have a drink containing alcohol? 3 Filed at: 03/22/2024 2106   2. How many drinks containing alcohol do you have on a typical day you are drinking?  4 Filed at: 03/22/2024 2106   3a. Male UNDER 65: How often do you have five or more drinks on one occasion? 3 Filed at: 03/22/2024 2106   Audit-C Score 10 Filed at: 03/22/2024 2106                  Medical Decision Making  Amount and/or Complexity of Data Reviewed  Labs: ordered.  Radiology: ordered.    Risk  Prescription drug  management.          Disposition  Final diagnoses:   Acute appendicitis, uncomplicated     Time reflects when diagnosis was documented in both MDM as applicable and the Disposition within this note       Time User Action Codes Description Comment    3/23/2024 12:47 AM Laura Gloria Add [K35.80] Acute appendicitis, uncomplicated           ED Disposition       None          Follow-up Information    None         Patient's Medications    No medications on file     No discharge procedures on file.    PDMP Review       None             ED Provider  Attending physically available and evaluated Seamus Reddy. I managed the patient along with the ED Attending.    Electronically Signed by           Laura Gloria MD  03/23/24 0052

## 2024-03-23 NOTE — OP NOTE
OPERATIVE REPORT  PATIENT NAME: Seamus Reddy    :  2000  MRN: [id number]  Pt Location: AN OR ROOM 04    SURGERY DATE: 3/23/2024    Surgeons and Role:     * Claus Ramos DO - Primary     * Javad Leger MD - Assisting     * Iris Bailey MD - Assisting    Preop Diagnosis:  Acute appendicitis, uncomplicated [K35.80]    Post-Op Diagnosis Codes:     * Acute appendicitis, uncomplicated [K35.80]    Procedure(s):  LAPAROSCOPIC APPENDECTOMY     Specimen(s):  ID Type Source Tests Collected by Time Destination   1 :  Tissue Appendix TISSUE EXAM Claus Ramos DO 3/23/2024 1327      Estimated Blood Loss:   Minimal    Drains:  Urethral Catheter Latex 16 Fr. (Active)   Number of days: 0     Anesthesia Type:   General    Operative Indications:  Acute appendicitis, uncomplicated [K35.80]    Operative Findings:  Acute non-perforated appendicitis    Complications:   None    Procedure and Technique:  After obtaining informed written consent, the patient was brought back to the operating room and placed in the supine position on the operating room table with both arms extended. Bilateral lower extremity SCDs were placed, preoperative antibiotics were administered, and the patient underwent uneventful induction of general anesthesia. A Swann catheter was inserted, the patient's left arm was tucked at his side, the abdomen was prepped/draped in the usual sterile fashion, and a universal timeout was undertaken where the patient's identity and proposed procedure were confirmed by all in the room.    0.5% Marcaine was infiltrated at Valdivia's point and a small stab incision was made. The Veress needle was used to gain entry into the abdominal cavity and, following a low initial opening pressure, pneumoperitoneum was established to 15mmHg. Additional local anesthetic was infiltrated, an infraumbilical incision was made, and a 5mm Optiview trocar was inserted into the abdominal cavity.  Initial inspection with the  laparoscopic camera revealed no injury from entry and the patient was positioned in Trendelenburg with his right side up. Following the infiltration of additional local anesthetic, two additional 5mm trocars were placed under direct visualization in the left lower quadrant and suprapubic region.    The tinea were followed to the point of their convergence where we identified acute nonperforated appendicitis. The mesoappendix was transected with the harmonic scalpel down to the appendiceal base, two 1. PDS Endoloops were placed on the proximal appendix also incorporating a small cuff of cecum, one 1. PDS Endoloop was placed just distal to these, and the appendix was transected with the harmonic scalpel. The appendix was placed into a 5mm Endo Catch bag and final inspection of the right lower quadrant revealed no ongoing hemorrhage with both Endoloops still in good positioning.    Following the confirmation of instrument/sponge/sharp counts, the specimen was extracted to be sent for pathology, pneumoperitoneum was completely evacuated, all skin incisions were closed with 4-0 Monocryl/skin glue, the Swann catheter was removed, and the patient was uneventfully extubated to be transported to PACU in stable condition.    Dr. Ramos was present for the entire procedure.    Patient Disposition:  PACU     This procedure was not performed to treat colon cancer through resection      SIGNATURE: Iris Bailey MD  DATE: March 23, 2024  TIME: 1:49 PM

## 2024-03-23 NOTE — ANESTHESIA POSTPROCEDURE EVALUATION
Post-Op Assessment Note    CV Status:  Stable  Pain Score: 0    Pain management: adequate       Mental Status:  Alert and awake    No anethesia notable event occurred.    Staff: Anesthesiologist               BP      Temp      Pulse     Resp      SpO2

## 2024-03-23 NOTE — PLAN OF CARE
Problem: PAIN - ADULT  Goal: Verbalizes/displays adequate comfort level or baseline comfort level  Description: Interventions:  - Encourage patient to monitor pain and request assistance  - Assess pain using appropriate pain scale  - Administer analgesics based on type and severity of pain and evaluate response  - Implement non-pharmacological measures as appropriate and evaluate response  - Consider cultural and social influences on pain and pain management  - Notify physician/advanced practitioner if interventions unsuccessful or patient reports new pain  Outcome: Progressing     Problem: INFECTION - ADULT  Goal: Absence or prevention of progression during hospitalization  Description: INTERVENTIONS:  - Assess and monitor for signs and symptoms of infection  - Monitor lab/diagnostic results  - Monitor all insertion sites, i.e. indwelling lines, tubes, and drains  - Monitor endotracheal if appropriate and nasal secretions for changes in amount and color  - [place] appropriate cooling/warming therapies per order  - Administer medications as ordered  - Instruct and encourage patient and family to use good hand hygiene technique  - Identify and instruct in appropriate isolation precautions for identified infection/condition  Outcome: Progressing  Goal: Absence of fever/infection during neutropenic period  Description: INTERVENTIONS:  - Monitor WBC    Outcome: Progressing     Problem: SAFETY ADULT  Goal: Patient will remain free of falls  Description: INTERVENTIONS:  - Educate patient/family on patient safety including physical limitations  - Instruct patient to call for assistance with activity   - Consult OT/PT to assist with strengthening/mobility   - Keep Call bell within reach  - Keep bed low and locked with side rails adjusted as appropriate  - Keep care items and personal belongings within reach  - Initiate and maintain comfort rounds  - Make Fall Risk Sign visible to staff  - Offer Toileting every  Hours,  in advance of need  - Initiate/Maintain alarm  - Obtain necessary fall risk management equipment:   - Apply yellow socks and bracelet for high fall risk patients  - Consider moving patient to room near nurses station  Outcome: Progressing  Goal: Maintain or return to baseline ADL function  Description: INTERVENTIONS:  -  Assess patient's ability to carry out ADLs; assess patient's baseline for ADL function and identify physical deficits which impact ability to perform ADLs (bathing, care of mouth/teeth, toileting, grooming, dressing, etc.)  - Assess/evaluate cause of self-care deficits   - Assess range of motion  - Assess patient's mobility; develop plan if impaired  - Assess patient's need for assistive devices and provide as appropriate  - Encourage maximum independence but intervene and supervise when necessary  - Involve family in performance of ADLs  - Assess for home care needs following discharge   - Consider OT consult to assist with ADL evaluation and planning for discharge  - Provide patient education as appropriate  Outcome: Progressing  Goal: Maintains/Returns to pre admission functional level  Description: INTERVENTIONS:  - Perform AM-PAC 6 Click Basic Mobility/ Daily Activity assessment daily.  - Set and communicate daily mobility goal to care team and patient/family/caregiver.   - Collaborate with rehabilitation services on mobility goals if consulted  - Perform Range of Motion  times a day.  - Reposition patient every  hours.  - Dangle patient times a day  - Stand patient  times a day  - Ambulate patient  times a day  - Out of bed to chair  times a day   - Out of bed for meals times a day  - Out of bed for toileting  - Record patient progress and toleration of activity level   Outcome: Progressing     Problem: DISCHARGE PLANNING  Goal: Discharge to home or other facility with appropriate resources  Description: INTERVENTIONS:  - Identify barriers to discharge w/patient and caregiver  - Arrange for  needed discharge resources and transportation as appropriate  - Identify discharge learning needs (meds, wound care, etc.)  - Arrange for interpretive services to assist at discharge as needed  - Refer to Case Management Department for coordinating discharge planning if the patient needs post-hospital services based on physician/advanced practitioner order or complex needs related to functional status, cognitive ability, or social support system  Outcome: Progressing     Problem: Knowledge Deficit  Goal: Patient/family/caregiver demonstrates understanding of disease process, treatment plan, medications, and discharge instructions  Description: Complete learning assessment and assess knowledge base.  Interventions:  - Provide teaching at level of understanding  - Provide teaching via preferred learning methods  Outcome: Progressing

## 2024-03-26 PROCEDURE — 88304 TISSUE EXAM BY PATHOLOGIST: CPT | Performed by: PATHOLOGY

## 2024-04-08 ENCOUNTER — TELEPHONE (OUTPATIENT)
Age: 24
End: 2024-04-08

## 2024-04-08 NOTE — TELEPHONE ENCOUNTER
Warm transfer to [facility] to assist with post op appt since the surgery was 03/23/24

## 2024-04-17 ENCOUNTER — OFFICE VISIT (OUTPATIENT)
Dept: SURGERY | Facility: CLINIC | Age: 24
End: 2024-04-17

## 2024-04-17 VITALS — HEIGHT: 75 IN | WEIGHT: 199.2 LBS | BODY MASS INDEX: 24.77 KG/M2 | TEMPERATURE: 97.8 F

## 2024-04-17 DIAGNOSIS — Z90.49 S/P LAPAROSCOPIC APPENDECTOMY: Primary | ICD-10-CM

## 2024-04-17 PROCEDURE — 99024 POSTOP FOLLOW-UP VISIT: CPT | Performed by: SURGERY

## 2024-04-17 NOTE — PROGRESS NOTES
Office Visit - General Surgery  Seamus Reddy MRN: [id number]  Encounter: [id number]  Assessment/Plan    Problem List Items Addressed This Visit        Surgery/Wound/Pain    S/P laparoscopic appendectomy - Primary     Doing well.  Activity as tolerated.  See us back here if needed.              Subjective    Seamus Reddy is a 23 y.o. male who presents status post laparoscopic appendectomy for acute appendicitis.  Feels well no complaints.      Pt  has no past medical history on file.    Pt  has a past surgical history that includes APPENDECTOMY LAPAROSCOPIC (N/A, 3/23/2024).    family history is not on file.    Seamus Reddy       Current Outpatient Medications on File Prior to Visit   Medication Sig Dispense Refill   • acetaminophen (TYLENOL) 325 mg tablet Take 2 tablets (650 mg total) by mouth every 6 (six) hours     • oxyCODONE (ROXICODONE) 5 immediate release tablet You may take 2.5 mg (0.5 tab) for moderate pain or 5 mg (1 tab) for severe pain, every 4 hours, as needed. (Patient not taking: Reported on 4/17/2024) 20 tablet 0     No current facility-administered medications on file prior to visit.     No Known Allergies    Review of Systems    Objective    Vitals:    04/17/24 1355   Temp: 97.8 °F (36.6 °C)       Physical Exam  Abdomen: Incisions healing well, soft, nontender

## (undated) DEVICE — TROCAR: Brand: KII® SLEEVE

## (undated) DEVICE — ADHESIVE SKIN HIGH VISCOSITY EXOFIN 1ML

## (undated) DEVICE — 3M™ STERI-STRIP™ REINFORCED ADHESIVE SKIN CLOSURES, R1547, 1/2 IN X 4 IN (12 MM X 100 MM), 6 STRIPS/ENVELOPE: Brand: 3M™ STERI-STRIP™

## (undated) DEVICE — PDS II VLT 0 107CM AG ST3: Brand: ENDOLOOP

## (undated) DEVICE — TISSUE RETRIEVAL SYSTEM: Brand: INZII RETRIEVAL SYSTEM

## (undated) DEVICE — TOWEL SURG XR DETECT GREEN STRL RFD

## (undated) DEVICE — INSUFFLATION NEEDLE TO ESTABLISH PNEUMOPERITONEUM.: Brand: INSUFFLATION NEEDLE

## (undated) DEVICE — TRAY FOLEY 16FR URIMETER SURESTEP

## (undated) DEVICE — SUT MONOCRYL 4-0 PS-2 27 IN Y426H

## (undated) DEVICE — PENCIL ELECTROSURG E-Z CLEAN -0035H

## (undated) DEVICE — GLOVE INDICATOR PI UNDERGLOVE SZ 8.5 BLUE

## (undated) DEVICE — INTENDED FOR TISSUE SEPARATION, AND OTHER PROCEDURES THAT REQUIRE A SHARP SURGICAL BLADE TO PUNCTURE OR CUT.: Brand: BARD-PARKER SAFETY BLADES SIZE 11, STERILE

## (undated) DEVICE — SUT VICRYL 0 UR-6 27 IN J603H

## (undated) DEVICE — TROCAR: Brand: KII FIOS FIRST ENTRY

## (undated) DEVICE — PACK PBDS LAP CHOLE RF

## (undated) DEVICE — HARMONIC 1100 SHEARS, 36CM SHAFT LENGTH: Brand: HARMONIC

## (undated) DEVICE — GLOVE SRG BIOGEL ECLIPSE 8.5

## (undated) DEVICE — 3M™ TEGADERM™ TRANSPARENT FILM DRESSING FRAME STYLE, 1624W, 2-3/8 IN X 2-3/4 IN (6 CM X 7 CM), 100/CT 4CT/CASE: Brand: 3M™ TEGADERM™